# Patient Record
Sex: MALE | Race: WHITE | NOT HISPANIC OR LATINO | Employment: OTHER | URBAN - METROPOLITAN AREA
[De-identification: names, ages, dates, MRNs, and addresses within clinical notes are randomized per-mention and may not be internally consistent; named-entity substitution may affect disease eponyms.]

---

## 2023-06-14 ENCOUNTER — OFFICE VISIT (OUTPATIENT)
Dept: PRIMARY CARE | Facility: CLINIC | Age: 78
End: 2023-06-14
Payer: COMMERCIAL

## 2023-06-14 VITALS
HEART RATE: 67 BPM | DIASTOLIC BLOOD PRESSURE: 78 MMHG | SYSTOLIC BLOOD PRESSURE: 121 MMHG | WEIGHT: 168 LBS | HEIGHT: 67 IN | BODY MASS INDEX: 26.37 KG/M2 | TEMPERATURE: 97.8 F

## 2023-06-14 DIAGNOSIS — R14.0 ABDOMINAL BLOATING: Primary | ICD-10-CM

## 2023-06-14 DIAGNOSIS — R35.0 URINARY FREQUENCY: ICD-10-CM

## 2023-06-14 DIAGNOSIS — R10.32 LEFT LOWER QUADRANT ABDOMINAL PAIN: ICD-10-CM

## 2023-06-14 PROBLEM — E78.5 HYPERLIPIDEMIA: Status: ACTIVE | Noted: 2023-06-14

## 2023-06-14 PROBLEM — K63.5 BENIGN COLON POLYP: Status: RESOLVED | Noted: 2023-06-14 | Resolved: 2023-06-14

## 2023-06-14 PROBLEM — D12.6 ADENOMATOUS POLYP OF COLON: Status: RESOLVED | Noted: 2023-06-14 | Resolved: 2023-06-14

## 2023-06-14 PROBLEM — Z90.79 S/P RADICAL CYSTOPROSTATECTOMY: Status: RESOLVED | Noted: 2023-06-14 | Resolved: 2023-06-14

## 2023-06-14 PROBLEM — L30.8 HERPES ZOSTER DERMATITIS: Status: RESOLVED | Noted: 2023-06-14 | Resolved: 2023-06-14

## 2023-06-14 PROBLEM — K21.9 GERD (GASTROESOPHAGEAL REFLUX DISEASE): Status: ACTIVE | Noted: 2023-06-14

## 2023-06-14 PROBLEM — N13.8 BPH WITH OBSTRUCTION/LOWER URINARY TRACT SYMPTOMS: Status: ACTIVE | Noted: 2023-06-14

## 2023-06-14 PROBLEM — Z90.6 S/P RADICAL CYSTOPROSTATECTOMY: Status: RESOLVED | Noted: 2023-06-14 | Resolved: 2023-06-14

## 2023-06-14 PROBLEM — B02.8 HERPES ZOSTER DERMATITIS: Status: RESOLVED | Noted: 2023-06-14 | Resolved: 2023-06-14

## 2023-06-14 PROBLEM — F41.9 ANXIETY: Status: ACTIVE | Noted: 2023-06-14

## 2023-06-14 PROBLEM — N40.1 BPH WITH OBSTRUCTION/LOWER URINARY TRACT SYMPTOMS: Status: ACTIVE | Noted: 2023-06-14

## 2023-06-14 PROBLEM — C61 PROSTATE CANCER (MULTI): Status: ACTIVE | Noted: 2023-06-14

## 2023-06-14 PROCEDURE — 1036F TOBACCO NON-USER: CPT | Performed by: INTERNAL MEDICINE

## 2023-06-14 PROCEDURE — 87186 SC STD MICRODIL/AGAR DIL: CPT

## 2023-06-14 PROCEDURE — 99213 OFFICE O/P EST LOW 20 MIN: CPT | Performed by: INTERNAL MEDICINE

## 2023-06-14 PROCEDURE — 1159F MED LIST DOCD IN RCRD: CPT | Performed by: INTERNAL MEDICINE

## 2023-06-14 PROCEDURE — 87086 URINE CULTURE/COLONY COUNT: CPT

## 2023-06-14 PROCEDURE — 87077 CULTURE AEROBIC IDENTIFY: CPT

## 2023-06-14 PROCEDURE — 1160F RVW MEDS BY RX/DR IN RCRD: CPT | Performed by: INTERNAL MEDICINE

## 2023-06-14 RX ORDER — ACETAMINOPHEN 500 MG
500 TABLET ORAL
COMMUNITY
Start: 2021-06-13

## 2023-06-14 RX ORDER — TADALAFIL 5 MG/1
1 TABLET ORAL DAILY
COMMUNITY
Start: 2021-09-23

## 2023-06-14 RX ORDER — DICYCLOMINE HYDROCHLORIDE 10 MG/1
10 CAPSULE ORAL 4 TIMES DAILY PRN
Qty: 30 CAPSULE | Refills: 0 | Status: SHIPPED | OUTPATIENT
Start: 2023-06-14 | End: 2023-08-13

## 2023-06-14 RX ORDER — FUROSEMIDE 20 MG/1
1 TABLET ORAL DAILY
COMMUNITY
Start: 2021-06-23

## 2023-06-14 RX ORDER — DOCUSATE SODIUM 100 MG/1
100 CAPSULE, LIQUID FILLED ORAL
COMMUNITY
Start: 2021-06-13

## 2023-06-14 ASSESSMENT — ENCOUNTER SYMPTOMS
POLYDIPSIA: 0
FEVER: 0
SHORTNESS OF BREATH: 0
CHILLS: 0
PALPITATIONS: 0
COUGH: 0

## 2023-06-14 NOTE — PROGRESS NOTES
"  Subjective   Patient ID: Wilfred Reed is a 77 y.o. male who presents for cramping (Left side cramping pain).    Patient presents in follow-up.  Reports that he has had some intermittent left lower quadrant tenderness and bloating symptoms associated with eating certain foods or meals.  Specifically remembers fried chicken being a provoker.  Also reports carbonated beverages [employer] being a provoker.  3 episodes in 1 month provoked after these types of meals.  No symptoms or episodes in between.  Denies chronic constipation.  Denies nausea vomiting GI intolerance jaundice diarrhea or GI distress.  There is no tenderness in the region outside of the specific events.  He felt significant relief after passing gas and these events.  Bowel habits are steady and consistent nearly every day but sometimes does admit to hard or difficult to pass stools.         Review of Systems   Constitutional:  Negative for chills and fever.   Respiratory:  Negative for cough and shortness of breath.    Cardiovascular:  Negative for chest pain and palpitations.   Endocrine: Negative for polydipsia and polyuria.       Objective   /78 (BP Location: Right arm, Patient Position: Sitting, BP Cuff Size: Adult)   Pulse 67   Temp 36.6 °C (97.8 °F) (Temporal)   Ht 1.689 m (5' 6.5\")   Wt 76.2 kg (168 lb)   BMI 26.71 kg/m²     Physical Exam  Constitutional:       Appearance: Normal appearance.   HENT:      Head: Normocephalic and atraumatic.   Eyes:      Extraocular Movements: Extraocular movements intact.      Pupils: Pupils are equal, round, and reactive to light.   Neck:      Thyroid: No thyroid mass or thyromegaly.      Vascular: No carotid bruit.   Cardiovascular:      Rate and Rhythm: Normal rate and regular rhythm.   Abdominal:      General: There is no distension.      Palpations: There is no mass.      Tenderness: There is no abdominal tenderness. There is no guarding or rebound.      Hernia: No hernia is present. "   Musculoskeletal:      Cervical back: Neck supple.      Right lower leg: No edema.      Left lower leg: No edema.   Lymphadenopathy:      Cervical: No cervical adenopathy.   Neurological:      Mental Status: He is alert.         Assessment/Plan   Problem List Items Addressed This Visit    None  Visit Diagnoses       Abdominal bloating    -  Primary    Relevant Medications    dicyclomine (Bentyl) 10 mg capsule    Left lower quadrant abdominal pain        Relevant Medications    dicyclomine (Bentyl) 10 mg capsule    Urinary frequency        Relevant Orders    Urine Culture

## 2023-06-14 NOTE — PATIENT INSTRUCTIONS
Recommend stopping or reducing carbonated beverages and provoking foods.   You can use this prescribed medication as needed if you have symptoms.   Recommend starting daily or every other day metamucil to keep bowels moving regularly and smoothly.

## 2023-06-18 LAB — URINE CULTURE: ABNORMAL

## 2023-06-20 NOTE — RESULT ENCOUNTER NOTE
Culture showed positive bacterium of Citrobacter however this is a common bacterium for the region and if the patient is asymptomatic antibiotics are not necessary in this case.  If he is having any symptoms we would likely want to consider treatment though

## 2023-12-15 DIAGNOSIS — N40.1 BPH WITH OBSTRUCTION/LOWER URINARY TRACT SYMPTOMS: Primary | ICD-10-CM

## 2023-12-15 DIAGNOSIS — N13.8 BPH WITH OBSTRUCTION/LOWER URINARY TRACT SYMPTOMS: Primary | ICD-10-CM

## 2024-01-17 ENCOUNTER — LAB (OUTPATIENT)
Dept: LAB | Facility: LAB | Age: 79
End: 2024-01-17
Payer: COMMERCIAL

## 2024-01-17 DIAGNOSIS — N40.1 BPH WITH OBSTRUCTION/LOWER URINARY TRACT SYMPTOMS: ICD-10-CM

## 2024-01-17 DIAGNOSIS — N13.8 BPH WITH OBSTRUCTION/LOWER URINARY TRACT SYMPTOMS: ICD-10-CM

## 2024-01-17 LAB
ANION GAP SERPL CALC-SCNC: 16 MMOL/L (ref 10–20)
BUN SERPL-MCNC: 25 MG/DL (ref 6–23)
CALCIUM SERPL-MCNC: 9.6 MG/DL (ref 8.6–10.6)
CHLORIDE SERPL-SCNC: 103 MMOL/L (ref 98–107)
CO2 SERPL-SCNC: 25 MMOL/L (ref 21–32)
CREAT SERPL-MCNC: 1.31 MG/DL (ref 0.5–1.3)
EGFRCR SERPLBLD CKD-EPI 2021: 56 ML/MIN/1.73M*2
GLUCOSE SERPL-MCNC: 117 MG/DL (ref 74–99)
POTASSIUM SERPL-SCNC: 4.8 MMOL/L (ref 3.5–5.3)
PSA SERPL-MCNC: <0.1 NG/ML
SODIUM SERPL-SCNC: 139 MMOL/L (ref 136–145)

## 2024-01-17 PROCEDURE — 36415 COLL VENOUS BLD VENIPUNCTURE: CPT

## 2024-01-17 PROCEDURE — 80048 BASIC METABOLIC PNL TOTAL CA: CPT

## 2024-01-17 PROCEDURE — 84153 ASSAY OF PSA TOTAL: CPT

## 2024-01-23 ENCOUNTER — HOSPITAL ENCOUNTER (OUTPATIENT)
Dept: RADIOLOGY | Facility: HOSPITAL | Age: 79
Discharge: HOME | End: 2024-01-23
Payer: COMMERCIAL

## 2024-01-23 DIAGNOSIS — C67.9 MALIGNANT NEOPLASM OF BLADDER, UNSPECIFIED (MULTI): ICD-10-CM

## 2024-01-23 DIAGNOSIS — C61 MALIGNANT NEOPLASM OF PROSTATE (MULTI): ICD-10-CM

## 2024-01-23 DIAGNOSIS — D49.4 NEOPLASM OF UNSPECIFIED BEHAVIOR OF BLADDER: ICD-10-CM

## 2024-01-23 PROCEDURE — 71250 CT THORAX DX C-: CPT

## 2024-01-23 PROCEDURE — 71250 CT THORAX DX C-: CPT | Performed by: STUDENT IN AN ORGANIZED HEALTH CARE EDUCATION/TRAINING PROGRAM

## 2024-02-01 ENCOUNTER — OFFICE VISIT (OUTPATIENT)
Dept: UROLOGY | Facility: CLINIC | Age: 79
End: 2024-02-01
Payer: COMMERCIAL

## 2024-02-01 VITALS — TEMPERATURE: 97.8 F | BODY MASS INDEX: 26.23 KG/M2 | WEIGHT: 165 LBS

## 2024-02-01 DIAGNOSIS — C67.9 MALIGNANT NEOPLASM OF URINARY BLADDER, UNSPECIFIED SITE (MULTI): Primary | ICD-10-CM

## 2024-02-01 DIAGNOSIS — C61 PROSTATE CANCER (MULTI): ICD-10-CM

## 2024-02-01 PROCEDURE — 1126F AMNT PAIN NOTED NONE PRSNT: CPT | Performed by: STUDENT IN AN ORGANIZED HEALTH CARE EDUCATION/TRAINING PROGRAM

## 2024-02-01 PROCEDURE — 88112 CYTOPATH CELL ENHANCE TECH: CPT

## 2024-02-01 PROCEDURE — 1159F MED LIST DOCD IN RCRD: CPT | Performed by: STUDENT IN AN ORGANIZED HEALTH CARE EDUCATION/TRAINING PROGRAM

## 2024-02-01 PROCEDURE — 99214 OFFICE O/P EST MOD 30 MIN: CPT | Performed by: STUDENT IN AN ORGANIZED HEALTH CARE EDUCATION/TRAINING PROGRAM

## 2024-02-01 PROCEDURE — 88112 CYTOPATH CELL ENHANCE TECH: CPT | Performed by: PATHOLOGY

## 2024-02-01 PROCEDURE — 1036F TOBACCO NON-USER: CPT | Performed by: STUDENT IN AN ORGANIZED HEALTH CARE EDUCATION/TRAINING PROGRAM

## 2024-02-01 ASSESSMENT — PAIN SCALES - GENERAL: PAINLEVEL: 0-NO PAIN

## 2024-02-01 NOTE — PROGRESS NOTES
HPI:  Procedure: RC+IC 6/2021  Path: T2 UC, -SMS -nodes + GG1 prostate cancer (-SMS)     78 year old male diagnosed with prostate cancer GG1 in 6/21. No longer getting spontaneous erections. CT urogram (2/14/23) showed moderate left hydronephrosis and diffuse ureteral dilation is similar to prior extending to the ileal conduit level, no renal or ureteral calculi demonstrated, there is slightly decreased left renal cortical enhancement compared to the right as well as less contrast opacification demonstrated from the left kidney compared to the right similar to prior which may related to the obstructive process such as stenosis at the junction of the left ureter and ileal conduit, no right hydroureteronephrosis, no right renal collecting system or ureteral filling defect identified, no new evidence to suggest metastatic disease throughout the abdomen and pelvis. CT chest (2/14/23) showed left upper lobe faint 3 mm nodule is stable and likely benign, additional previously described tiny peripheral nodules are no longer seen, no new significant pulmonary nodule. Cre 1.31 (1/17/24). PSA <0.10 (1/17/24). CT chest (1/23/24) showed stable few sub 6 mm mainly left upper lobe pulmonary nodules, no new suspicious pulmonary nodules, no suspicious mediastinal lymphadenopathy, partially visualized left-sided moderate hydronephrosis. Reports stoma is normal appearence, denies leakage. Doing well.      Cre: 1.31 (1/17/24), 1.23 (2/7/23), 1.3 (baseline <1)  PSA: <0.10 (1/17/24), undetectable (2/7/23), undetectable (9/21)     CT urogram (2/14/23): moderate left hydronephrosis and diffuse ureteral dilation is similar to prior extending to the ileal conduit level, no renal or ureteral calculi demonstrated, there is slightly decreased left renal cortical enhancement compared to the right as well as less contrast opacification demonstrated from the left kidney compared to the right similar to prior which may related to the obstructive  process such as stenosis at the junction of the left ureter and ileal conduit, no right hydroureteronephrosis, no right renal collecting system or ureteral filling defect identified, no new evidence to suggest metastatic disease throughout the abdomen and pelvis.      CT chest (2/14/23): left upper lobe faint 3 mm nodule is stable and likely benign, additional previously described tiny peripheral nodules are no longer seen, no new significant pulmonary nodule     CT chest (1/23/24): stable few sub 6 mm mainly left upper lobe pulmonary nodules, no new suspicious pulmonary nodules, no suspicious mediastinal lymphadenopathy, partially visualized left-sided moderate hydronephrosis    Review of Systems:  All systems reviewed. Anything negative noted in the HPI.    Physical Exam:  Vitals signs reviewed.  Constitutional:      Appearance: Well-developed.  HENT:     Head: Normocephalic and atraumatic.  Neck:     Musculoskeletal: Normal range of motion.  Pulmonary:     Effort: Pulmonary effort is normal.  Musculoskeletal: Normal range of motion.  Skin:     General: Skin is warm and dry.  Neurological:     Mental Status: Alert and oriented to person, place, and time.  Psychiatric:        Behavior: Behavior normal.        Thought Content: Thought content normal.        Judgment: Judgment normal.  Genitourinary:     Penis: Normal.      Scrotum/Testes: Normal.     Comments:  Abdominal:     Palpations: Abdomen is soft. There is no mass.     Tenderness: There is no tenderness. There is no guarding or rebound.     Hernia: No hernia is present.     Comments:     Procedures:    Assessment/Plan   78 year old male diagnosed with prostate cancer GG1 in 6/21. No longer getting spontaneous erections. CT urogram (2/14/23) showed moderate left hydronephrosis and diffuse ureteral dilation is similar to prior extending to the ileal conduit level, no renal or ureteral calculi demonstrated, there is slightly decreased left renal cortical  enhancement compared to the right as well as less contrast opacification demonstrated from the left kidney compared to the right similar to prior which may related to the obstructive process such as stenosis at the junction of the left ureter and ileal conduit, no right hydroureteronephrosis, no right renal collecting system or ureteral filling defect identified, no new evidence to suggest metastatic disease throughout the abdomen and pelvis. CT chest (2/14/23) showed left upper lobe faint 3 mm nodule is stable and likely benign, additional previously described tiny peripheral nodules are no longer seen, no new significant pulmonary nodule. Cre 1.31 (1/17/24). PSA <0.10 (1/17/24). Reports stoma is normal appearence, denies leakage. Doing well. Patient underwent urethral washing in office. Management options including risks, benefits and alternatives discussed at length and all questions answered. Patient prefers to proceed with CT urogram and will call with results. Will follow-up in 1 year with CT CAP. Will refer to Camilo Jaquez NP to monitor PSA.         By signing my name below, I, Yuliana Galarza, attest that this documentation has been prepared under the direction and in the presence of Dr. Edi Mena.  All medical record entries made by the Scribe were at my direction and personally dictated by me. I have reviewed the chart and agree that the record accurately reflects my personal performance of the history, physical exam, discussion and plan.

## 2024-02-01 NOTE — LETTER
February 1, 2024     Jozef Talbot MD  [id number] [facility] [id number]    Patient: Wilfred Reed   YOB: 1945   Date of Visit: 2/1/2024       Dear Dr. Jozef Talbot MD:    Thank you for referring Wilfred Reed to me for evaluation. Below are my notes for this consultation.  If you have questions, please do not hesitate to call me. I look forward to following your patient along with you.       Sincerely,     Edi Mena MD      CC: Camilo Jaquez, KAREN-CNP  ______________________________________________________________________________________    HPI:  Procedure: RC+IC 6/2021  Path: T2 UC, -SMS -nodes + GG1 prostate cancer (-SMS)     78 year old male diagnosed with prostate cancer GG1 in 6/21. No longer getting spontaneous erections. CT urogram (2/14/23) showed moderate left hydronephrosis and diffuse ureteral dilation is similar to prior extending to the ileal conduit level, no renal or ureteral calculi demonstrated, there is slightly decreased left renal cortical enhancement compared to the right as well as less contrast opacification demonstrated from the left kidney compared to the right similar to prior which may related to the obstructive process such as stenosis at the junction of the left ureter and ileal conduit, no right hydroureteronephrosis, no right renal collecting system or ureteral filling defect identified, no new evidence to suggest metastatic disease throughout the abdomen and pelvis. CT chest (2/14/23) showed left upper lobe faint 3 mm nodule is stable and likely benign, additional previously described tiny peripheral nodules are no longer seen, no new significant pulmonary nodule. Cre 1.31 (1/17/24). PSA <0.10 (1/17/24). CT chest (1/23/24) showed stable few sub 6 mm mainly left upper lobe pulmonary nodules, no new suspicious pulmonary nodules, no suspicious mediastinal lymphadenopathy, partially visualized left-sided moderate hydronephrosis. Reports stoma is  normal appearence, denies leakage. Doing well.      Cre: 1.31 (1/17/24), 1.23 (2/7/23), 1.3 (baseline <1)  PSA: <0.10 (1/17/24), undetectable (2/7/23), undetectable (9/21)     CT urogram (2/14/23): moderate left hydronephrosis and diffuse ureteral dilation is similar to prior extending to the ileal conduit level, no renal or ureteral calculi demonstrated, there is slightly decreased left renal cortical enhancement compared to the right as well as less contrast opacification demonstrated from the left kidney compared to the right similar to prior which may related to the obstructive process such as stenosis at the junction of the left ureter and ileal conduit, no right hydroureteronephrosis, no right renal collecting system or ureteral filling defect identified, no new evidence to suggest metastatic disease throughout the abdomen and pelvis.      CT chest (2/14/23): left upper lobe faint 3 mm nodule is stable and likely benign, additional previously described tiny peripheral nodules are no longer seen, no new significant pulmonary nodule     CT chest (1/23/24): stable few sub 6 mm mainly left upper lobe pulmonary nodules, no new suspicious pulmonary nodules, no suspicious mediastinal lymphadenopathy, partially visualized left-sided moderate hydronephrosis    Review of Systems:  All systems reviewed. Anything negative noted in the HPI.    Physical Exam:  Vitals signs reviewed.  Constitutional:      Appearance: Well-developed.  HENT:     Head: Normocephalic and atraumatic.  Neck:     Musculoskeletal: Normal range of motion.  Pulmonary:     Effort: Pulmonary effort is normal.  Musculoskeletal: Normal range of motion.  Skin:     General: Skin is warm and dry.  Neurological:     Mental Status: Alert and oriented to person, place, and time.  Psychiatric:        Behavior: Behavior normal.        Thought Content: Thought content normal.        Judgment: Judgment normal.  Genitourinary:     Penis: Normal.      Scrotum/Testes:  Normal.     Comments:  Abdominal:     Palpations: Abdomen is soft. There is no mass.     Tenderness: There is no tenderness. There is no guarding or rebound.     Hernia: No hernia is present.     Comments:     Procedures:    Assessment/Plan  78 year old male diagnosed with prostate cancer GG1 in 6/21. No longer getting spontaneous erections. CT urogram (2/14/23) showed moderate left hydronephrosis and diffuse ureteral dilation is similar to prior extending to the ileal conduit level, no renal or ureteral calculi demonstrated, there is slightly decreased left renal cortical enhancement compared to the right as well as less contrast opacification demonstrated from the left kidney compared to the right similar to prior which may related to the obstructive process such as stenosis at the junction of the left ureter and ileal conduit, no right hydroureteronephrosis, no right renal collecting system or ureteral filling defect identified, no new evidence to suggest metastatic disease throughout the abdomen and pelvis. CT chest (2/14/23) showed left upper lobe faint 3 mm nodule is stable and likely benign, additional previously described tiny peripheral nodules are no longer seen, no new significant pulmonary nodule. Cre 1.31 (1/17/24). PSA <0.10 (1/17/24). Reports stoma is normal appearence, denies leakage. Doing well. Patient underwent urethral washing in office. Management options including risks, benefits and alternatives discussed at length and all questions answered. Patient prefers to proceed with CT urogram and will call with results. Will follow-up in 1 year with CT CAP. Will refer to Camilo Jaquez NP to monitor PSA.         By signing my name below, I, Yuliana Galarza, attest that this documentation has been prepared under the direction and in the presence of Dr. Edi Mena.  All medical record entries made by the Scribe were at my direction and personally dictated by me. I have reviewed the chart  and agree that the record accurately reflects my personal performance of the history, physical exam, discussion and plan.

## 2024-02-02 LAB
LABORATORY COMMENT REPORT: NORMAL
LABORATORY COMMENT REPORT: NORMAL
PATH REPORT.FINAL DX SPEC: NORMAL
PATH REPORT.GROSS SPEC: NORMAL
PATH REPORT.RELEVANT HX SPEC: NORMAL
PATH REPORT.TOTAL CANCER: NORMAL

## 2024-02-22 ENCOUNTER — HOSPITAL ENCOUNTER (OUTPATIENT)
Dept: RADIOLOGY | Facility: HOSPITAL | Age: 79
Discharge: HOME | End: 2024-02-22
Payer: COMMERCIAL

## 2024-02-22 DIAGNOSIS — C67.9 MALIGNANT NEOPLASM OF URINARY BLADDER, UNSPECIFIED SITE (MULTI): ICD-10-CM

## 2024-02-22 PROCEDURE — 76376 3D RENDER W/INTRP POSTPROCES: CPT | Performed by: RADIOLOGY

## 2024-02-22 PROCEDURE — 76377 3D RENDER W/INTRP POSTPROCES: CPT

## 2024-02-22 PROCEDURE — 2550000001 HC RX 255 CONTRASTS: Performed by: STUDENT IN AN ORGANIZED HEALTH CARE EDUCATION/TRAINING PROGRAM

## 2024-02-22 PROCEDURE — 74178 CT ABD&PLV WO CNTR FLWD CNTR: CPT | Performed by: RADIOLOGY

## 2024-02-22 RX ADMIN — IOHEXOL 75 ML: 350 INJECTION, SOLUTION INTRAVENOUS at 10:18

## 2024-02-28 DIAGNOSIS — C67.9 MALIGNANT NEOPLASM OF URINARY BLADDER, UNSPECIFIED SITE (MULTI): Primary | ICD-10-CM

## 2024-03-07 ENCOUNTER — TELEPHONE (OUTPATIENT)
Dept: PRIMARY CARE | Facility: CLINIC | Age: 79
End: 2024-03-07
Payer: COMMERCIAL

## 2024-03-07 NOTE — TELEPHONE ENCOUNTER
Dr. Mena did this. He needs to contact him for results. I am not going to supercede the specialist.

## 2024-05-28 ENCOUNTER — OFFICE VISIT (OUTPATIENT)
Dept: PRIMARY CARE | Facility: CLINIC | Age: 79
End: 2024-05-28
Payer: COMMERCIAL

## 2024-05-28 VITALS
HEART RATE: 76 BPM | SYSTOLIC BLOOD PRESSURE: 123 MMHG | WEIGHT: 164 LBS | BODY MASS INDEX: 26.07 KG/M2 | TEMPERATURE: 99.3 F | DIASTOLIC BLOOD PRESSURE: 77 MMHG

## 2024-05-28 DIAGNOSIS — N13.8 BPH WITH OBSTRUCTION/LOWER URINARY TRACT SYMPTOMS: Primary | ICD-10-CM

## 2024-05-28 DIAGNOSIS — Z93.59 SUPRAPUBIC CATHETER (MULTI): ICD-10-CM

## 2024-05-28 DIAGNOSIS — N40.1 BPH WITH OBSTRUCTION/LOWER URINARY TRACT SYMPTOMS: Primary | ICD-10-CM

## 2024-05-28 PROCEDURE — 99214 OFFICE O/P EST MOD 30 MIN: CPT | Performed by: INTERNAL MEDICINE

## 2024-05-28 PROCEDURE — 87086 URINE CULTURE/COLONY COUNT: CPT

## 2024-05-28 PROCEDURE — 1159F MED LIST DOCD IN RCRD: CPT | Performed by: INTERNAL MEDICINE

## 2024-05-28 ASSESSMENT — ENCOUNTER SYMPTOMS
FLANK PAIN: 0
ARTHRALGIAS: 0
DECREASED CONCENTRATION: 0
DYSURIA: 0
SLEEP DISTURBANCE: 0
FATIGUE: 1
CHILLS: 0
HEADACHES: 0
APPETITE CHANGE: 0
BACK PAIN: 0
UNEXPECTED WEIGHT CHANGE: 0
LIGHT-HEADEDNESS: 0
SHORTNESS OF BREATH: 0
NECK PAIN: 0
DIFFICULTY URINATING: 0
ABDOMINAL PAIN: 1
WHEEZING: 0
CHEST TIGHTNESS: 0
BLOOD IN STOOL: 0
ROS GI COMMENTS: BLOATING
FREQUENCY: 0
SORE THROAT: 0
PALPITATIONS: 0
WEAKNESS: 1
ACTIVITY CHANGE: 0
NERVOUS/ANXIOUS: 0
COUGH: 0
DIZZINESS: 0

## 2024-05-28 NOTE — PROGRESS NOTES
Subjective   Patient ID: Wilfred Reed is a 78 y.o. male who presents for UTI (Pt present today for UTI symptoms. ls).    HPI- Pt c/o weakness, fatigue, abd bloating. C/o abd discomfort and bloating after eating 3 hotdogs yesterday.  Bladder cancer ,Prostate cancer .    Review of Systems   Constitutional:  Positive for fatigue. Negative for activity change, appetite change, chills and unexpected weight change.   HENT:  Negative for congestion, postnasal drip and sore throat.    Eyes:  Negative for visual disturbance.   Respiratory:  Negative for cough, chest tightness, shortness of breath and wheezing.    Cardiovascular:  Negative for chest pain, palpitations and leg swelling.   Gastrointestinal:  Positive for abdominal pain. Negative for blood in stool.        Bloating   Endocrine: Negative for cold intolerance and heat intolerance.   Genitourinary:  Negative for difficulty urinating, dysuria, flank pain and frequency.   Musculoskeletal:  Negative for arthralgias, back pain, gait problem and neck pain.   Skin:  Negative for rash.   Allergic/Immunologic: Negative for environmental allergies and food allergies.   Neurological:  Positive for weakness. Negative for dizziness, light-headedness and headaches.   Psychiatric/Behavioral:  Negative for decreased concentration and sleep disturbance. The patient is not nervous/anxious.        Objective   /77   Pulse 76   Temp 37.4 °C (99.3 °F)   Wt 74.4 kg (164 lb)   BMI 26.07 kg/m²     Physical Exam  Vitals reviewed.   Constitutional:       General: He is not in acute distress.     Appearance: Normal appearance.   HENT:      Head: Normocephalic and atraumatic.   Cardiovascular:      Rate and Rhythm: Normal rate and regular rhythm.      Pulses: Normal pulses.   Pulmonary:      Effort: Pulmonary effort is normal. No respiratory distress.      Breath sounds: Normal breath sounds.   Abdominal:      General: Bowel sounds are normal. There is no distension.       Tenderness: There is no abdominal tenderness.   Musculoskeletal:         General: No swelling or tenderness.   Skin:     General: Skin is warm.   Neurological:      General: No focal deficit present.      Mental Status: He is alert.      Coordination: Coordination normal.      Gait: Gait normal.   Psychiatric:         Mood and Affect: Mood normal.         Behavior: Behavior normal.         Assessment/Plan   Diagnoses and all orders for this visit:  BPH with obstruction/lower urinary tract symptoms  Comments:  h/o bladder and prostate ca- s/p cysto prostatectomy  Orders:  -     CBC; Future  -     Basic Metabolic Panel; Future  -     Prostate Spec.Ag,Screen; Future  Suprapubic catheter (Multi)  Comments:  suprapubic cathter with foul smelling and dark urine- U/A  unable to obtain  send urine cultures  Follow with urology Dr Mena  Orders:  -     Urine Culture; Future  Blood work ordered  Urine culture sent  Follow with pcp and urology

## 2024-05-29 LAB — BACTERIA UR CULT: ABNORMAL

## 2024-05-30 ENCOUNTER — TELEPHONE (OUTPATIENT)
Dept: PRIMARY CARE | Facility: CLINIC | Age: 79
End: 2024-05-30
Payer: COMMERCIAL

## 2024-07-08 DIAGNOSIS — C67.9 MALIGNANT NEOPLASM OF URINARY BLADDER, UNSPECIFIED SITE (MULTI): Primary | ICD-10-CM

## 2024-07-08 NOTE — PROGRESS NOTES
Nicole Bookersabinezen   [id number]   7/2/2020         Spoke with: no answer    Received Medications?:not applicable    Follow Up Appt?:not applicable    Cardio Pulmonary Rehab Appt?:not applicable    Comments: no answer. Will retry 07/06/2020    Agueda Nolen RN     Radiology called requesting new orders for Ct scans. Orders placed.

## 2024-07-15 ENCOUNTER — TELEPHONE (OUTPATIENT)
Dept: UROLOGY | Facility: HOSPITAL | Age: 79
End: 2024-07-15
Payer: COMMERCIAL

## 2024-07-24 ENCOUNTER — LAB (OUTPATIENT)
Dept: LAB | Facility: LAB | Age: 79
End: 2024-07-24
Payer: COMMERCIAL

## 2024-07-24 DIAGNOSIS — C67.9 MALIGNANT NEOPLASM OF URINARY BLADDER, UNSPECIFIED SITE (MULTI): ICD-10-CM

## 2024-07-24 LAB
ANION GAP SERPL CALC-SCNC: 18 MMOL/L (ref 10–20)
BUN SERPL-MCNC: 22 MG/DL (ref 6–23)
CALCIUM SERPL-MCNC: 9.8 MG/DL (ref 8.6–10.6)
CHLORIDE SERPL-SCNC: 98 MMOL/L (ref 98–107)
CO2 SERPL-SCNC: 24 MMOL/L (ref 21–32)
CREAT SERPL-MCNC: 1.34 MG/DL (ref 0.5–1.3)
EGFRCR SERPLBLD CKD-EPI 2021: 55 ML/MIN/1.73M*2
GLUCOSE SERPL-MCNC: 105 MG/DL (ref 74–99)
POTASSIUM SERPL-SCNC: 4.6 MMOL/L (ref 3.5–5.3)
SODIUM SERPL-SCNC: 135 MMOL/L (ref 136–145)

## 2024-07-24 PROCEDURE — 80048 BASIC METABOLIC PNL TOTAL CA: CPT

## 2024-07-24 PROCEDURE — 36415 COLL VENOUS BLD VENIPUNCTURE: CPT

## 2024-07-26 ENCOUNTER — HOSPITAL ENCOUNTER (OUTPATIENT)
Dept: RADIOLOGY | Facility: CLINIC | Age: 79
Discharge: HOME | End: 2024-07-26
Payer: COMMERCIAL

## 2024-07-26 DIAGNOSIS — C67.9 MALIGNANT NEOPLASM OF URINARY BLADDER, UNSPECIFIED SITE (MULTI): ICD-10-CM

## 2024-07-26 PROCEDURE — 2550000001 HC RX 255 CONTRASTS: Performed by: STUDENT IN AN ORGANIZED HEALTH CARE EDUCATION/TRAINING PROGRAM

## 2024-07-26 PROCEDURE — 74177 CT ABD & PELVIS W/CONTRAST: CPT

## 2024-08-01 ENCOUNTER — APPOINTMENT (OUTPATIENT)
Dept: UROLOGY | Facility: CLINIC | Age: 79
End: 2024-08-01
Payer: COMMERCIAL

## 2024-08-01 VITALS
WEIGHT: 163.8 LBS | HEART RATE: 72 BPM | TEMPERATURE: 97.8 F | BODY MASS INDEX: 26.04 KG/M2 | SYSTOLIC BLOOD PRESSURE: 112 MMHG | DIASTOLIC BLOOD PRESSURE: 70 MMHG

## 2024-08-01 DIAGNOSIS — R30.0 DYSURIA: Primary | ICD-10-CM

## 2024-08-01 DIAGNOSIS — C61 PROSTATE CANCER (MULTI): ICD-10-CM

## 2024-08-01 DIAGNOSIS — C67.9 MALIGNANT NEOPLASM OF URINARY BLADDER, UNSPECIFIED SITE (MULTI): ICD-10-CM

## 2024-08-01 PROCEDURE — 99214 OFFICE O/P EST MOD 30 MIN: CPT | Performed by: STUDENT IN AN ORGANIZED HEALTH CARE EDUCATION/TRAINING PROGRAM

## 2024-08-01 PROCEDURE — 1159F MED LIST DOCD IN RCRD: CPT | Performed by: STUDENT IN AN ORGANIZED HEALTH CARE EDUCATION/TRAINING PROGRAM

## 2024-08-01 PROCEDURE — G2211 COMPLEX E/M VISIT ADD ON: HCPCS | Performed by: STUDENT IN AN ORGANIZED HEALTH CARE EDUCATION/TRAINING PROGRAM

## 2024-08-01 PROCEDURE — 1126F AMNT PAIN NOTED NONE PRSNT: CPT | Performed by: STUDENT IN AN ORGANIZED HEALTH CARE EDUCATION/TRAINING PROGRAM

## 2024-08-01 RX ORDER — LEVOFLOXACIN 500 MG/1
500 TABLET, FILM COATED ORAL DAILY
Qty: 14 TABLET | Refills: 0 | Status: SHIPPED | OUTPATIENT
Start: 2024-08-01 | End: 2024-08-15

## 2024-08-01 ASSESSMENT — PAIN SCALES - GENERAL: PAINLEVEL: 0-NO PAIN

## 2024-08-01 NOTE — PROGRESS NOTES
HPI:  Procedure: RC+IC 6/2021  Path: T2 UC, -SMS -nodes + GG1 prostate cancer (-SMS)     79 year old male diagnosed with prostate cancer GG1 in 6/21. CT urogram (2/14/23) showed moderate left hydronephrosis and diffuse ureteral dilation is similar to prior extending to the ileal conduit level, no renal or ureteral calculi demonstrated, there is slightly decreased left renal cortical enhancement compared to the right as well as less contrast opacification demonstrated from the left kidney compared to the right similar to prior which may related to the obstructive process such as stenosis at the junction of the left ureter and ileal conduit, no right hydroureteronephrosis, no right renal collecting system or ureteral filling defect identified, no new evidence to suggest metastatic disease throughout the abdomen and pelvis. CT chest (2/14/23) showed left upper lobe faint 3 mm nodule is stable and likely benign, additional previously described tiny peripheral nodules are no longer seen, no new significant pulmonary nodule. CT chest (1/23/24) showed stable few sub 6 mm mainly left upper lobe pulmonary nodules, no new suspicious pulmonary nodules, no suspicious mediastinal lymphadenopathy, partially visualized left-sided moderate hydronephrosis. CT CAP (7/26/24) showed similar moderate to severe left hydroureteronephrosis with decreased enhancement the left kidney and cortical atrophy, diffuse urothelial thickening with renal sinus fat and periureteric stranding, similar mild fullness of the right renal pelvis and right ureter, posterior cystoprostatectomy with ileal conduit, eccentric soft tissue component in this nodule likely represents volume averaging from the adjacent renal cortex, interval increase in the retroperitoneal lymph nodes. Reports dark urine and flank pain.      Cre: 1.31 (1/17/24), 1.23 (2/7/23), 1.3 (baseline <1)  PSA: <0.10 (1/17/24), undetectable (2/7/23), undetectable (9/21)     CT urogram  (2/14/23): moderate left hydronephrosis and diffuse ureteral dilation is similar to prior extending to the ileal conduit level, no renal or ureteral calculi demonstrated, there is slightly decreased left renal cortical enhancement compared to the right as well as less contrast opacification demonstrated from the left kidney compared to the right similar to prior which may related to the obstructive process such as stenosis at the junction of the left ureter and ileal conduit, no right hydroureteronephrosis, no right renal collecting system or ureteral filling defect identified, no new evidence to suggest metastatic disease throughout the abdomen and pelvis.      CT chest (2/14/23): left upper lobe faint 3 mm nodule is stable and likely benign, additional previously described tiny peripheral nodules are no longer seen, no new significant pulmonary nodule     CT chest (1/23/24): stable few sub 6 mm mainly left upper lobe pulmonary nodules, no new suspicious pulmonary nodules, no suspicious mediastinal lymphadenopathy, partially visualized left-sided moderate hydronephrosis    CT CAP (7/26/24): similar moderate to severe left hydroureteronephrosis with decreased enhancement the left kidney and cortical atrophy, diffuse urothelial thickening with renal sinus fat and periureteric stranding, similar mild fullness of the right renal pelvis and right ureter, posterior cystoprostatectomy with ileal conduit, eccentric soft tissue component in this nodule likely represents volume averaging from the adjacent renal cortex, interval increase in the retroperitoneal lymph nodes which can be reactive to the urinary tract infection, new nodular thickening and enhancement of the gallbladder wall as described above which can be inflammatory or neoplastic in etiology.    Review of Systems:  All systems reviewed. Anything negative noted in the HPI.    Physical Exam:  Vitals signs reviewed.  Constitutional:      Appearance:  Well-developed.  HENT:     Head: Normocephalic and atraumatic.  Neck:     Musculoskeletal: Normal range of motion.  Pulmonary:     Effort: Pulmonary effort is normal.  Musculoskeletal: Normal range of motion.  Skin:     General: Skin is warm and dry.  Neurological:     Mental Status: Alert and oriented to person, place, and time.  Psychiatric:        Behavior: Behavior normal.        Thought Content: Thought content normal.        Judgment: Judgment normal.  Genitourinary:     Penis: Normal.      Scrotum/Testes: Normal.     Comments:  Abdominal:     Palpations: Abdomen is soft. There is no mass.     Tenderness: There is no tenderness. There is no guarding or rebound.     Hernia: No hernia is present.     Comments:     Procedures:    Assessment/Plan   79 year old male diagnosed with prostate cancer GG1 in 6/21. CT chest (1/23/24) showed stable few sub 6 mm mainly left upper lobe pulmonary nodules, no new suspicious pulmonary nodules, no suspicious mediastinal lymphadenopathy, partially visualized left-sided moderate hydronephrosis. CT CAP (7/26/24) showed similar moderate to severe left hydroureteronephrosis with decreased enhancement the left kidney and cortical atrophy, diffuse urothelial thickening with renal sinus fat and periureteric stranding, similar mild fullness of the right renal pelvis and right ureter, posterior cystoprostatectomy with ileal conduit, eccentric soft tissue component in this nodule likely represents volume averaging from the adjacent renal cortex, interval increase in the retroperitoneal lymph nodes. Reports dark urine and flank pain. Management options including risks, benefits and alternatives discussed at length and all questions answered. Patient prefers to proceed with follow-up virtually in 1 month to monitor for improvement in symptoms with antibiotics.     Rx Levaquin           Scribe Attestation  By signing my name below, Ewa ZENG Scribe   attest that this documentation  has been prepared under the direction and in the presence of Edi Mena MD.

## 2024-08-28 ENCOUNTER — APPOINTMENT (OUTPATIENT)
Dept: PRIMARY CARE | Facility: CLINIC | Age: 79
End: 2024-08-28
Payer: COMMERCIAL

## 2024-08-28 VITALS
BODY MASS INDEX: 26.3 KG/M2 | DIASTOLIC BLOOD PRESSURE: 68 MMHG | HEART RATE: 58 BPM | SYSTOLIC BLOOD PRESSURE: 116 MMHG | WEIGHT: 165.4 LBS | TEMPERATURE: 97.5 F

## 2024-08-28 DIAGNOSIS — N39.0 URINARY TRACT INFECTION WITHOUT HEMATURIA, SITE UNSPECIFIED: ICD-10-CM

## 2024-08-28 DIAGNOSIS — M25.552 LEFT HIP PAIN: Primary | ICD-10-CM

## 2024-08-28 PROCEDURE — 1159F MED LIST DOCD IN RCRD: CPT | Performed by: INTERNAL MEDICINE

## 2024-08-28 PROCEDURE — 96372 THER/PROPH/DIAG INJ SC/IM: CPT | Performed by: INTERNAL MEDICINE

## 2024-08-28 PROCEDURE — 99213 OFFICE O/P EST LOW 20 MIN: CPT | Performed by: INTERNAL MEDICINE

## 2024-08-28 PROCEDURE — 1160F RVW MEDS BY RX/DR IN RCRD: CPT | Performed by: INTERNAL MEDICINE

## 2024-08-28 PROCEDURE — 1036F TOBACCO NON-USER: CPT | Performed by: INTERNAL MEDICINE

## 2024-08-28 RX ORDER — TRIAMCINOLONE ACETONIDE 40 MG/ML
40 INJECTION, SUSPENSION INTRA-ARTICULAR; INTRAMUSCULAR ONCE
Status: COMPLETED | OUTPATIENT
Start: 2024-08-28 | End: 2024-08-28

## 2024-08-28 RX ORDER — TIZANIDINE 2 MG/1
2 TABLET ORAL EVERY 8 HOURS PRN
Qty: 45 TABLET | Refills: 0 | Status: SHIPPED | OUTPATIENT
Start: 2024-08-28

## 2024-08-28 ASSESSMENT — ENCOUNTER SYMPTOMS
FEVER: 0
SHORTNESS OF BREATH: 0
HIP PAIN: 1
PALPITATIONS: 0
POLYDIPSIA: 0
CHILLS: 0
COUGH: 0

## 2024-08-28 NOTE — PROGRESS NOTES
Subjective   Patient ID: Wilfred Reed is a 79 y.o. male who presents for Follow-up and Hip Pain.    Rapid resolution of UTI from levofloxacin from urology. No recurring urinary color issues, abdominal pain, fevers or chills    Advised on good hand hygiene and bag hygiene.     Left posterior hip pain, with some intermittent L groin pain post pushing heavy equipment across yard. Possible worsened from repetitive lifting.     Hip Pain          Review of Systems   Constitutional:  Negative for chills and fever.   Respiratory:  Negative for cough and shortness of breath.    Cardiovascular:  Negative for chest pain and palpitations.   Endocrine: Negative for polydipsia and polyuria.       Objective   /68 (Patient Position: Sitting)   Pulse 58   Temp 36.4 °C (97.5 °F) (Temporal)   Wt 75 kg (165 lb 6.4 oz)   BMI 26.30 kg/m²     Physical Exam  Musculoskeletal:      Left hip: Normal. No lacerations, tenderness, bony tenderness or crepitus.        Legs:       Comments: Negative pain to internal and external rotation, adduction or abduction         Assessment/Plan   Assessment & Plan  Left hip pain  Post pushing cart across lawn. XR hip and lumbar advised for baseline with acute kenalog injection today.  Orders:    triamcinolone acetonide (Kenalog-40) injection 40 mg    tiZANidine (Zanaflex) 2 mg tablet; Take 1 tablet (2 mg) by mouth every 8 hours if needed for muscle spasms.    Urinary tract infection without hematuria, site unspecified  S/P Levaquin- symptoms resolved.   Patient advised about proper hand hygiene and bag care.              
Yes

## 2024-09-05 ENCOUNTER — APPOINTMENT (OUTPATIENT)
Dept: UROLOGY | Facility: CLINIC | Age: 79
End: 2024-09-05
Payer: COMMERCIAL

## 2024-09-05 DIAGNOSIS — C61 PROSTATE CANCER (MULTI): Primary | ICD-10-CM

## 2024-09-05 DIAGNOSIS — C67.9 MALIGNANT NEOPLASM OF URINARY BLADDER, UNSPECIFIED SITE (MULTI): ICD-10-CM

## 2024-09-05 PROCEDURE — G2211 COMPLEX E/M VISIT ADD ON: HCPCS | Performed by: STUDENT IN AN ORGANIZED HEALTH CARE EDUCATION/TRAINING PROGRAM

## 2024-09-05 PROCEDURE — 99213 OFFICE O/P EST LOW 20 MIN: CPT | Performed by: STUDENT IN AN ORGANIZED HEALTH CARE EDUCATION/TRAINING PROGRAM

## 2024-09-05 NOTE — PROGRESS NOTES
HPI:  Procedure: RC+IC 6/2021  Path: T2 UC, -SMS -nodes + GG1 prostate cancer (-SMS)     79 year old male diagnosed with prostate cancer GG1 in 6/21. CT urogram (2/14/23) showed moderate left hydronephrosis and diffuse ureteral dilation is similar to prior extending to the ileal conduit level, no renal or ureteral calculi demonstrated, there is slightly decreased left renal cortical enhancement compared to the right as well as less contrast opacification demonstrated from the left kidney compared to the right similar to prior which may related to the obstructive process such as stenosis at the junction of the left ureter and ileal conduit, no right hydroureteronephrosis, no right renal collecting system or ureteral filling defect identified, no new evidence to suggest metastatic disease throughout the abdomen and pelvis. CT chest (2/14/23) showed left upper lobe faint 3 mm nodule is stable and likely benign, additional previously described tiny peripheral nodules are no longer seen, no new significant pulmonary nodule. CT chest (1/23/24) showed stable few sub 6 mm mainly left upper lobe pulmonary nodules, no new suspicious pulmonary nodules, no suspicious mediastinal lymphadenopathy, partially visualized left-sided moderate hydronephrosis. CT CAP (7/26/24) showed similar moderate to severe left hydroureteronephrosis with decreased enhancement the left kidney and cortical atrophy, diffuse urothelial thickening with renal sinus fat and periureteric stranding, similar mild fullness of the right renal pelvis and right ureter, posterior cystoprostatectomy with ileal conduit, eccentric soft tissue component in this nodule likely represents volume averaging from the adjacent renal cortex, interval increase in the retroperitoneal lymph nodes. Reports all symptoms have resolved after finishing antibiotics. Doing well.      Cre: 1.34 (7/24/24), 1.31 (1/17/24), 1.23 (2/7/23), 1.3 (baseline <1)  PSA: <0.10 (1/17/24),  undetectable (2/7/23), undetectable (9/21)     CT urogram (2/14/23): moderate left hydronephrosis and diffuse ureteral dilation is similar to prior extending to the ileal conduit level, no renal or ureteral calculi demonstrated, there is slightly decreased left renal cortical enhancement compared to the right as well as less contrast opacification demonstrated from the left kidney compared to the right similar to prior which may related to the obstructive process such as stenosis at the junction of the left ureter and ileal conduit, no right hydroureteronephrosis, no right renal collecting system or ureteral filling defect identified, no new evidence to suggest metastatic disease throughout the abdomen and pelvis.      CT chest (2/14/23): left upper lobe faint 3 mm nodule is stable and likely benign, additional previously described tiny peripheral nodules are no longer seen, no new significant pulmonary nodule     CT chest (1/23/24): stable few sub 6 mm mainly left upper lobe pulmonary nodules, no new suspicious pulmonary nodules, no suspicious mediastinal lymphadenopathy, partially visualized left-sided moderate hydronephrosis    CT CAP (7/26/24): similar moderate to severe left hydroureteronephrosis with decreased enhancement the left kidney and cortical atrophy, diffuse urothelial thickening with renal sinus fat and periureteric stranding, similar mild fullness of the right renal pelvis and right ureter, posterior cystoprostatectomy with ileal conduit, eccentric soft tissue component in this nodule likely represents volume averaging from the adjacent renal cortex, interval increase in the retroperitoneal lymph nodes which can be reactive to the urinary tract infection, new nodular thickening and enhancement of the gallbladder wall as described above which can be inflammatory or neoplastic in etiology.    Review of Systems:  All systems reviewed. Anything negative noted in the HPI.    Physical Exam:  Virtual  visit.     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 CT chest (1/23/24) showed stable few sub 6 mm mainly left upper lobe pulmonary nodules, no new suspicious pulmonary nodules, no suspicious mediastinal lymphadenopathy, partially visualized left-sided moderate hydronephrosis. CT CAP (7/26/24) showed similar moderate to severe left hydroureteronephrosis with decreased enhancement the left kidney and cortical atrophy, diffuse urothelial thickening with renal sinus fat and periureteric stranding, similar mild fullness of the right renal pelvis and right ureter, posterior cystoprostatectomy with ileal conduit, eccentric soft tissue component in this nodule likely represents volume averaging from the adjacent renal cortex, interval increase in the retroperitoneal lymph nodes. Reports all symptoms have resolved after finishing antibiotics. Doing well. Management options including risks, benefits and alternatives discussed at length and all questions answered. Patient prefers to proceed with follow-up 10mos with PSA, BMP, CT CAP.          Scribe Attestation  By signing my name below, IEwa Scribe   attest that this documentation has been prepared under the direction and in the presence of Edi Mena MD.

## 2024-09-16 ENCOUNTER — TELEPHONE (OUTPATIENT)
Dept: UROLOGY | Facility: CLINIC | Age: 79
End: 2024-09-16
Payer: COMMERCIAL

## 2024-09-16 NOTE — TELEPHONE ENCOUNTER
Dianna LVM on nurse line stating that pt is in need of ostomy supplies. Dianna stated that [facility] should be faxing over the order form that needs to be signed by the provider in order for the supplies to be shipped out.     Spoke with Dianna about the prescription. Dianna stated that pt is currently out of supplies and needs the prescription sent in as soon as possible. Advised Dianna that this will be signed and sent in as soon as it is received by Dr Mena. Dianna stated understanding and requested a call back when this is completed. Please advise on the order.

## 2024-10-30 ENCOUNTER — TELEPHONE (OUTPATIENT)
Dept: PRIMARY CARE | Facility: CLINIC | Age: 79
End: 2024-10-30

## 2024-10-30 NOTE — TELEPHONE ENCOUNTER
Pts wife has called stating pt is showing signs of an bladder or possible kidney infection and is requesting a antibiotic to help with this

## 2024-10-31 DIAGNOSIS — N39.0 URINARY TRACT INFECTION WITHOUT HEMATURIA, SITE UNSPECIFIED: Primary | ICD-10-CM

## 2024-10-31 RX ORDER — LEVOFLOXACIN 500 MG/1
500 TABLET, FILM COATED ORAL DAILY
Qty: 7 TABLET | Refills: 0 | Status: SHIPPED | OUTPATIENT
Start: 2024-10-31 | End: 2024-11-07

## 2025-02-06 ENCOUNTER — APPOINTMENT (OUTPATIENT)
Dept: UROLOGY | Facility: CLINIC | Age: 80
End: 2025-02-06
Payer: COMMERCIAL

## 2025-07-01 DIAGNOSIS — C67.9 MALIGNANT NEOPLASM OF URINARY BLADDER, UNSPECIFIED SITE (MULTI): ICD-10-CM

## 2025-07-01 DIAGNOSIS — C61 PROSTATE CANCER (MULTI): ICD-10-CM

## 2025-09-02 ENCOUNTER — APPOINTMENT (OUTPATIENT)
Dept: RADIOLOGY | Facility: CLINIC | Age: 80
End: 2025-09-02
Payer: MEDICARE

## 2025-09-06 LAB
ANION GAP SERPL CALCULATED.4IONS-SCNC: 11 MMOL/L (CALC) (ref 7–17)
BUN SERPL-MCNC: 25 MG/DL (ref 7–25)
BUN/CREAT SERPL: 18 (CALC) (ref 6–22)
CALCIUM SERPL-MCNC: 9.5 MG/DL (ref 8.6–10.3)
CHLORIDE SERPL-SCNC: 105 MMOL/L (ref 98–110)
CO2 SERPL-SCNC: 23 MMOL/L (ref 20–32)
CREAT SERPL-MCNC: 1.37 MG/DL (ref 0.7–1.28)
EGFRCR SERPLBLD CKD-EPI 2021: 53 ML/MIN/1.73M2
GLUCOSE SERPL-MCNC: 106 MG/DL (ref 65–99)
POTASSIUM SERPL-SCNC: 4.7 MMOL/L (ref 3.5–5.3)
PSA SERPL-MCNC: <0.04 NG/ML
SODIUM SERPL-SCNC: 139 MMOL/L (ref 135–146)

## 2025-09-18 ENCOUNTER — APPOINTMENT (OUTPATIENT)
Dept: UROLOGY | Facility: CLINIC | Age: 80
End: 2025-09-18
Payer: COMMERCIAL